# Patient Record
Sex: MALE | Race: WHITE | NOT HISPANIC OR LATINO
[De-identification: names, ages, dates, MRNs, and addresses within clinical notes are randomized per-mention and may not be internally consistent; named-entity substitution may affect disease eponyms.]

---

## 2019-04-26 ENCOUNTER — RECORDS - HEALTHEAST (OUTPATIENT)
Dept: ADMINISTRATIVE | Facility: OTHER | Age: 69
End: 2019-04-26

## 2019-06-11 ASSESSMENT — MIFFLIN-ST. JEOR: SCORE: 2345.25

## 2019-06-13 ENCOUNTER — ANESTHESIA - HEALTHEAST (OUTPATIENT)
Dept: SURGERY | Facility: CLINIC | Age: 69
End: 2019-06-13

## 2019-06-13 ENCOUNTER — SURGERY - HEALTHEAST (OUTPATIENT)
Dept: SURGERY | Facility: CLINIC | Age: 69
End: 2019-06-13

## 2019-06-13 ENCOUNTER — HOSPITAL ENCOUNTER (OUTPATIENT)
Dept: CT IMAGING | Facility: CLINIC | Age: 69
Discharge: HOME | End: 2019-06-13
Attending: UROLOGY | Admitting: RADIOLOGY

## 2019-06-13 DIAGNOSIS — N28.89 RIGHT RENAL MASS: ICD-10-CM

## 2019-06-13 ASSESSMENT — MIFFLIN-ST. JEOR
SCORE: 2323.22
SCORE: 2319.25

## 2019-06-14 LAB
LAB AP CHARGES (HE HISTORICAL CONVERSION): NORMAL
PATH REPORT.COMMENTS IMP SPEC: NORMAL
PATH REPORT.FINAL DX SPEC: NORMAL
PATH REPORT.GROSS SPEC: NORMAL
PATH REPORT.MICROSCOPIC SPEC OTHER STN: NORMAL
PATH REPORT.RELEVANT HX SPEC: NORMAL
RESULT FLAG (HE HISTORICAL CONVERSION): NORMAL

## 2020-01-20 ENCOUNTER — RECORDS - HEALTHEAST (OUTPATIENT)
Dept: ADMINISTRATIVE | Facility: OTHER | Age: 70
End: 2020-01-20

## 2020-02-12 ENCOUNTER — HOSPITAL ENCOUNTER (OUTPATIENT)
Dept: CT IMAGING | Facility: CLINIC | Age: 70
Discharge: HOME | End: 2020-02-12
Attending: UROLOGY | Admitting: RADIOLOGY

## 2020-02-12 ENCOUNTER — HOSPITAL ENCOUNTER (OUTPATIENT)
Dept: CT IMAGING | Facility: CLINIC | Age: 70
Discharge: HOME | End: 2020-02-12
Attending: UROLOGY

## 2020-02-12 DIAGNOSIS — Z85.528 PERSONAL HISTORY OF OTHER MALIGNANT NEOPLASM OF KIDNEY: ICD-10-CM

## 2020-02-12 LAB
HGB BLD-MCNC: 12.4 G/DL (ref 14–18)
HGB BLD-MCNC: 13.2 G/DL (ref 14–18)
HGB BLD-MCNC: 13.2 G/DL (ref 14–18)
INR PPP: 1.03 (ref 0.9–1.1)
PLATELET # BLD AUTO: 234 THOU/UL (ref 140–440)
PLATELET # BLD AUTO: 237 THOU/UL (ref 140–440)

## 2020-02-12 ASSESSMENT — MIFFLIN-ST. JEOR: SCORE: 2356.56

## 2020-02-14 LAB
CAP COMMENT: ABNORMAL
LAB AP CHARGES (HE HISTORICAL CONVERSION): ABNORMAL
LAB AP INITIAL CYTO EVAL (HE HISTORICAL CONVERSION): ABNORMAL
LAB MED GENERAL PATH INTERP (HE HISTORICAL CONVERSION): ABNORMAL
PATH REPORT.COMMENTS IMP SPEC: ABNORMAL
PATH REPORT.COMMENTS IMP SPEC: ABNORMAL
PATH REPORT.FINAL DX SPEC: ABNORMAL
PATH REPORT.MICROSCOPIC SPEC OTHER STN: ABNORMAL
PATH REPORT.RELEVANT HX SPEC: ABNORMAL
RESULT FLAG (HE HISTORICAL CONVERSION): ABNORMAL
SPECIMEN DESCRIPTION: ABNORMAL

## 2021-05-29 NOTE — ANESTHESIA CARE TRANSFER NOTE
Last vitals:   Vitals:    06/13/19 1239   BP: 158/72   Pulse: 85   Resp: 14   Temp: 36.6  C (97.8  F)   SpO2: 100%     Patient's level of consciousness is drowsy  Spontaneous respirations: yes  Maintains airway independently: yes  Dentition unchanged: yes  Oropharynx: oropharynx clear of all foreign objects    QCDR Measures:  ASA# 20 - Surgical Safety Checklist: WHO surgical safety checklist completed prior to induction    PQRS# 430 - Adult PONV Prevention: 4558F - Pt received => 2 anti-emetic agents (different classes) preop & intraop  ASA# 8 - Peds PONV Prevention: NA - Not pediatric patient, not GA or 2 or more risk factors NOT present  PQRS# 424 - Ricarda-op Temp Management: 4559F - At least one body temp DOCUMENTED => 35.5C or 95.9F within required timeframe  PQRS# 426 - PACU Transfer Protocol: - Transfer of care checklist used  ASA# 14 - Acute Post-op Pain: ASA14B - Patient did NOT experience pain >= 7 out of 10

## 2021-05-29 NOTE — ANESTHESIA POSTPROCEDURE EVALUATION
Patient: William Faye  CT GUIDED PERCUTANEOUS RIGHT RENAL CRYOABLATION  Anesthesia type: general    Patient location: PACU  Last vitals:   Vitals Value Taken Time   /78 6/13/2019  7:45 PM   Temp 36.3  C (97.3  F) 6/13/2019  3:45 PM   Pulse 93 6/13/2019  7:45 PM   Resp 18 6/13/2019  7:45 PM   SpO2 97 % 6/13/2019  7:45 PM     Post vital signs: stable  Level of consciousness: awake and responds to simple questions  Post-anesthesia pain: pain controlled  Post-anesthesia nausea and vomiting: no  Pulmonary: unassisted, return to baseline  Cardiovascular: stable and blood pressure at baseline  Hydration: adequate  Anesthetic events: no    QCDR Measures:  ASA# 11 - Ricarda-op Cardiac Arrest: ASA11B - Patient did NOT experience unanticipated cardiac arrest  ASA# 12 - Ricarda-op Mortality Rate: ASA12B - Patient did NOT die  ASA# 13 - PACU Re-Intubation Rate: ASA13B - Patient did NOT require a new airway mgmt  ASA# 10 - Composite Anes Safety: ASA10A - No serious adverse event    Additional Notes:

## 2021-05-29 NOTE — ANESTHESIA PREPROCEDURE EVALUATION
Anesthesia Evaluation      Patient summary reviewed     Airway   Mallampati: II  Neck ROM: full   Pulmonary - normal exam    breath sounds clear to auscultation  (+) COPD, sleep apnea on CPAP, ,                          Cardiovascular - normal exam  (+) hypertension well controlled, ,     Rhythm: regular  Rate: normal,         Neuro/Psych      Endo/Other    (+) obesity,      GI/Hepatic/Renal    (+) GERD,             Dental - normal exam                        Anesthesia Plan  Planned anesthetic: general endotracheal    ASA 4   Induction: intravenous   Anesthetic plan and risks discussed with: patient, spouse and child/children  Anesthesia plan special considerations: antiemetics,   Post-op plan: routine recovery

## 2021-06-01 ENCOUNTER — RECORDS - HEALTHEAST (OUTPATIENT)
Dept: ADMINISTRATIVE | Facility: CLINIC | Age: 71
End: 2021-06-01

## 2021-06-03 VITALS — BODY MASS INDEX: 45.1 KG/M2 | HEIGHT: 70 IN | WEIGHT: 315 LBS

## 2021-06-04 VITALS — HEIGHT: 70 IN | BODY MASS INDEX: 45.1 KG/M2 | WEIGHT: 315 LBS

## 2021-06-06 NOTE — PROGRESS NOTES
Spoke with Dr. Pope about patient's BP.  Per PTA review patient took his amlodipine, lisinopril, and atenolol this AM.  Will order 10mg hydralazine per Dr. Pope for CHUY RN to give.

## 2021-06-06 NOTE — PRE-PROCEDURE
Procedure Name: CT right renal mass biopys  Date/Time: 2/12/2020 10:20 AM    Verbal consent obtained?: Yes  Written consent obtained?: Yes  Risks and benefits: Risks, benefits and alternatives were discussed  Discharge plan: Appropriate discharge home plan in place for patients who are going home after procedure   Consent given by: patient  Expected level of sedation: moderate  ASA Class: Class 3- Severe systemic disease, definite functional limitations  Mallampati: Grade 2- soft palate, base of uvula, tonsillar pillars, and portion of posterior pharyngeal wall visible  Patient states understanding of procedure being performed: Yes  Patient's understanding of procedure matches consent: Yes  Procedure consent matches procedure scheduled: Yes  Appropriately NPO: yes  Lungs: lungs clear with good breath sounds bilaterally, other (comment) and wheezes  Lung exam comment: mild expiratory wheeze bilaterally  Heart: normal heart sounds and rate  History & Physical reviewed: History and physical reviewed and no updates needed  Statement of review: I have reviewed the lab findings, diagnostic data, medications, and the plan for sedation

## 2021-06-06 NOTE — PROCEDURES
[facility]    Procedure: IR Procedure Note  Date/Time: 2/12/2020 11:09 AM  Performed by: Byron Pope MD  Authorized by: Byron Pope MD       Universal Protocol    Site marked: Yes    Prior images obtained and reviewed: Yes    Required items: required blood products, implants, devices, and special equipment available    Patient identity confirmed: verbally with patient, provided demographic data, hospital-assigned identification number and arm band    Reevaluation: Patient was reevaluated immediately before administering moderate or deep sedation or anesthesia    Confirmation checklist: patient's identity using two indicators, relevant allergies, procedure was appropriate and matched the consent or emergent situation and correct equipment/implants were available    Time out: Immediately prior to procedure a time out was called to verify the correct patient, procedure, equipment, support staff and site/side marked as required    Universal Protocol: Joint Commission Universal Protocol was followed    Preparation: Patient was prepped and draped in the usual sterile fashion  Specimens: core needle biopsy specimens sent for pathological analysis  Complications: None  Condition: Stable    Post-procedure    Description of procedure: Biopsy lower pole right renal mass    Patient tolerance: Patient tolerated the procedure well with no immediate complications   Length of time physician present for 1:1 monitoring during sedation: 20

## 2021-06-06 NOTE — PROCEDURES
INTERVENTIONAL RADIOLOGY    Procedure:  CT right renal biopsy    Meds:  Versed 2 mg IV   Fentanyl 100 mcg IV    MD:  Niko    Complications:  None    Findings:  Lower pole right renal mass.  5 18 g biopsies obtained.    Plan:  Observation 6 hours then home if stable.

## 2021-06-16 PROBLEM — N28.89 RENAL MASS: Status: ACTIVE | Noted: 2019-06-13

## 2022-04-11 NOTE — SEDATION DOCUMENTATION
BP cuff moved from right ankle to left lower arm BP within normal range 98/55  
Pt HR dropped to 45 from mid 60's asymptomatic and then HR goes back up to mid 60's, MD is aware.  BP 86/48  Normal saline wide open MD also aware monitoring  
- - -